# Patient Record
Sex: MALE | URBAN - METROPOLITAN AREA
[De-identification: names, ages, dates, MRNs, and addresses within clinical notes are randomized per-mention and may not be internally consistent; named-entity substitution may affect disease eponyms.]

---

## 2017-12-08 ENCOUNTER — EMERGENCY (EMERGENCY)
Facility: HOSPITAL | Age: 52
LOS: 1 days | Discharge: HOME | End: 2017-12-08
Attending: EMERGENCY MEDICINE | Admitting: EMERGENCY MEDICINE
Payer: MEDICAID

## 2017-12-08 VITALS — WEIGHT: 160.06 LBS | HEIGHT: 67 IN

## 2017-12-08 VITALS
SYSTOLIC BLOOD PRESSURE: 118 MMHG | TEMPERATURE: 98 F | OXYGEN SATURATION: 98 % | RESPIRATION RATE: 18 BRPM | HEART RATE: 81 BPM | DIASTOLIC BLOOD PRESSURE: 86 MMHG
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
## 2017-12-08 DIAGNOSIS — S93.124A DISLOCATION OF METATARSOPHALANGEAL JOINT OF RIGHT LESSER TOE(S), INITIAL ENCOUNTER: ICD-10-CM
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
## 2017-12-08 DIAGNOSIS — M79.674 PAIN IN RIGHT TOE(S): ICD-10-CM
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
## 2017-12-08 PROCEDURE — 73660 X-RAY EXAM OF TOE(S): CPT | Mod: 26,RT

## 2017-12-08 PROCEDURE — 28660 TREAT TOE DISLOCATION: CPT | Mod: RT

## 2017-12-08 PROCEDURE — 99284 EMERGENCY DEPT VISIT MOD MDM: CPT | Mod: 25

## 2017-12-08 RX ORDER — IBUPROFEN 200 MG
600 TABLET ORAL ONCE
Refills: 0 | Status: COMPLETED | OUTPATIENT
Start: 2017-12-08 | End: 2017-12-08

## 2017-12-08 RX ADMIN — Medication 600 MILLIGRAM(S): at 13:27

## 2017-12-08 NOTE — ED ADULT NURSE NOTE - MUSCULOSKELETAL WDL
Full range of motion of upper and lower extremities, no joint tenderness/swelling. right 2nd toe deformed, +swelling and tenderness, nonweigth bearing.

## 2017-12-08 NOTE — ED ADULT TRIAGE NOTE - CHIEF COMPLAINT QUOTE
Pt bibems from shelter for right second toe disclocation s/p kicking off shoe. Cap refill <2 sec, good color.

## 2017-12-08 NOTE — ED PROVIDER NOTE - DIAGNOSTIC INTERPRETATION
Interpreted by ED physician  Toe R x-ray, 3 views  No fracture, reduced dislocation, no Foreign Body noted, soft tissue normal

## 2017-12-08 NOTE — ED PROVIDER NOTE - OBJECTIVE STATEMENT
53 y/o M w/ no PMH, NKDA, presents s/p trauma to his 2nd toe at 2:30am. He kicked something and noted deformity to the toe but decided not to seek medical care at that time. Now he is having worsening swelling and pain. No other trauma.

## 2017-12-08 NOTE — ED PROCEDURE NOTE - PROCEDURE ADDITIONAL DETAILS
Digital block: 2% lidocaine, 1.5 ml Digital block: 2% lidocaine, 1.5 ml    Podiatry follow up, surgical shoe PRN. Digital block: site prepped with alcohol, 2% lidocaine, 1.5 ml injected    Podiatry follow up, surgical shoe PRN.

## 2017-12-08 NOTE — ED PROVIDER NOTE - MUSCULOSKELETAL, MLM
R 2nd toe is deformed, displaced laterally with edema and decreased ROM secondary to pain. Normal cap refill and sensation.
